# Patient Record
Sex: FEMALE | Race: WHITE | NOT HISPANIC OR LATINO | Employment: UNEMPLOYED | URBAN - METROPOLITAN AREA
[De-identification: names, ages, dates, MRNs, and addresses within clinical notes are randomized per-mention and may not be internally consistent; named-entity substitution may affect disease eponyms.]

---

## 2024-11-16 ENCOUNTER — OFFICE VISIT (OUTPATIENT)
Dept: URGENT CARE | Age: 29
End: 2024-11-16
Payer: COMMERCIAL

## 2024-11-16 VITALS
SYSTOLIC BLOOD PRESSURE: 106 MMHG | BODY MASS INDEX: 20.66 KG/M2 | RESPIRATION RATE: 14 BRPM | WEIGHT: 128 LBS | OXYGEN SATURATION: 98 % | DIASTOLIC BLOOD PRESSURE: 60 MMHG | HEART RATE: 66 BPM

## 2024-11-16 DIAGNOSIS — B37.31 CANDIDA VAGINITIS: Primary | ICD-10-CM

## 2024-11-16 RX ORDER — FLUCONAZOLE 150 MG/1
150 TABLET ORAL SEE ADMIN INSTRUCTIONS
Qty: 2 TABLET | Refills: 0 | Status: SHIPPED | OUTPATIENT
Start: 2024-11-16 | End: 2024-11-17

## 2024-11-16 ASSESSMENT — PAIN SCALES - GENERAL: PAINLEVEL_OUTOF10: 0-NO PAIN

## 2024-11-17 NOTE — PROGRESS NOTES
Subjective   Patient ID: Moriah Durán is a 28 y.o. female. She presents today with a chief complaint of Vaginal Itching and Vaginal Discharge (X5 days started monostat Wednesday.) Patient presents with a 5 day history of vaginal itching and white chunky vaginal discharge. She tried 3 days of Monistat which has improved but not resolved her symptoms. She denies abdominal and pelvic pain. No urinary symptoms are reported. She is on continuous OCP.    History of Present Illness    Vaginal Itching  Vaginal Discharge  Associated symptoms: vaginal itching        Past Medical History  Allergies as of 11/16/2024    (No Known Allergies)       (Not in a hospital admission)       No past medical history on file.    No past surgical history on file.     reports that she has never smoked. She does not have any smokeless tobacco history on file.    Review of Systems  Review of Systems   Genitourinary:  Positive for vaginal discharge.                                  Objective    Vitals:    11/16/24 2010   BP: 106/60   BP Location: Left arm   Patient Position: Sitting   BP Cuff Size: Adult   Pulse: 66   Resp: 14   SpO2: 98%   Weight: 58.1 kg (128 lb)     No LMP recorded (lmp unknown).    Physical Exam  Constitutional:       Appearance: Normal appearance.   Skin:     General: Skin is warm and dry.   Neurological:      General: No focal deficit present.      Mental Status: She is alert.         Procedures    Point of Care Test & Imaging Results from this visit  No results found for this visit on 11/16/24.   No results found.    Diagnostic study results (if any) were reviewed by Jasmyne Armstrong MD.    Assessment/Plan   Allergies, medications, history, and pertinent labs/EKGs/Imaging reviewed by Jasmyne Armstrong MD.       Orders and Diagnoses  There are no diagnoses linked to this encounter.    Medical Admin Record      Patient disposition: Home    Electronically signed by Jamsyne Armstrong MD  8:20 PM

## 2025-01-30 ENCOUNTER — APPOINTMENT (OUTPATIENT)
Dept: URGENT CARE | Age: 30
End: 2025-01-30
Payer: COMMERCIAL